# Patient Record
Sex: MALE | Race: WHITE
[De-identification: names, ages, dates, MRNs, and addresses within clinical notes are randomized per-mention and may not be internally consistent; named-entity substitution may affect disease eponyms.]

---

## 2021-03-05 ENCOUNTER — HOSPITAL ENCOUNTER (OUTPATIENT)
Dept: HOSPITAL 47 - LABWHC1 | Age: 76
Discharge: HOME | End: 2021-03-05
Attending: INTERNAL MEDICINE
Payer: MEDICARE

## 2021-03-05 DIAGNOSIS — E78.5: ICD-10-CM

## 2021-03-05 DIAGNOSIS — Z95.2: ICD-10-CM

## 2021-03-05 DIAGNOSIS — I10: Primary | ICD-10-CM

## 2021-03-05 LAB — BUN SERPL-SCNC: 18 MG/DL (ref 9–20)

## 2021-03-05 PROCEDURE — 82565 ASSAY OF CREATININE: CPT

## 2021-03-05 PROCEDURE — 84520 ASSAY OF UREA NITROGEN: CPT

## 2021-03-05 PROCEDURE — 36415 COLL VENOUS BLD VENIPUNCTURE: CPT

## 2021-03-11 ENCOUNTER — HOSPITAL ENCOUNTER (OUTPATIENT)
Dept: HOSPITAL 47 - RADCTMAIN | Age: 76
End: 2021-03-11
Attending: INTERNAL MEDICINE
Payer: MEDICARE

## 2021-03-11 DIAGNOSIS — I71.2: ICD-10-CM

## 2021-03-11 DIAGNOSIS — R91.1: Primary | ICD-10-CM

## 2021-03-11 DIAGNOSIS — Z95.2: ICD-10-CM

## 2021-03-11 DIAGNOSIS — I25.10: ICD-10-CM

## 2021-03-11 DIAGNOSIS — E27.9: ICD-10-CM

## 2021-03-11 LAB — BUN SERPL-SCNC: 16 MG/DL (ref 9–20)

## 2021-03-11 PROCEDURE — 84520 ASSAY OF UREA NITROGEN: CPT

## 2021-03-11 PROCEDURE — 82565 ASSAY OF CREATININE: CPT

## 2021-03-11 PROCEDURE — 71275 CT ANGIOGRAPHY CHEST: CPT

## 2021-03-11 PROCEDURE — 36415 COLL VENOUS BLD VENIPUNCTURE: CPT

## 2021-03-11 NOTE — CT
EXAMINATION TYPE: CT angio chest

 

DATE OF EXAM: 3/11/2021

 

COMPARISON: None

 

HISTORY: [age]-year-old male I71.4, history of aortic valve replacement, aortic aneurysm without
 rupture

 

TECHNIQUE: Contiguous axial scanning of the chest performed without and with IV Contrast, patient inj
ected with 100 mL of Isovue 370. Coronal/sagittal reconstructions performed. 3-D reconstructions gene
rated on a dedicated independent workstation.

 

CT DLP: 1050.3 mGycm

Automated exposure control for dose reduction was used.

 

FINDINGS: 

Median sternotomy wires are present. Prosthetic aortic valve.  Heart upper limits of normal in size w
ithout pericardial effusion. Three-vessel coronary artery calcifications are present.

 

Mildly aneurysmal ascending aorta 4.0 cm.

Mild atherosclerotic arch calcifications with conventional branching anatomy.

Ectatic upper descending thoracic aorta at 3.2 cm.

Mid descending thoracic aorta mildly ectatic at 2.7 cm.

Distal descending thoracic aorta normal at 2.3 cm.

Initial noncontrast images show no evidence for acute injury or hematoma. No aortic dissection.

 

A couple prominent but nonenlarged left hilar lymph nodes measuring up to 1 cm. Otherwise, no thoraci
c lymphadenopathy.

 

Some dependent atelectasis is noted. Strandy scarring or atelectasis at the left base.

 

There is a lobulated mass patients measuring 3.0 x 2.7 cm left suprahilar upper lobe.

4 mm pulmonary nodule left upper lobe, axial image 16.

 

Visualized upper abdomen shows bilateral renal cysts measuring up to 2.9 cm on the left. There is an 
indeterminate 1 cm left adrenal nodule. Multiple moderate stool burden.

 

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
 anterior endplate spondylosis. Changes of DISH are present.

 

 

IMPRESSION: 

1. SUSPICIOUS LEFT SUPRAHILAR MASS MEASURING 3.0 CM. FINDINGS CONCERNING FOR LUNG CANCER. APPROPRIATE
 FURTHER WORKUP AND MANAGEMENT RECOMMENDED.

2. A NONSPECIFIC 4 MM SATELLITE NODULE IN THE LEFT UPPER LOBE.

3. NONSPECIFIC 1 CM NODULE IN THE LEFT ADRENAL GLAND . FOLLOW-UP RECOMMENDED.

4. MILD ANEURYSM ASCENDING AORTA AT 4.0 CM. ECTATIC UPPER DESCENDING THORACIC AORTA AT 3.2 CM.

5. PROSTHETIC AORTIC VALVE. THREE-VESSEL CORONARY ARTERY CALCIFICATIONS.